# Patient Record
Sex: FEMALE | Race: WHITE | NOT HISPANIC OR LATINO | URBAN - METROPOLITAN AREA
[De-identification: names, ages, dates, MRNs, and addresses within clinical notes are randomized per-mention and may not be internally consistent; named-entity substitution may affect disease eponyms.]

---

## 2020-03-17 ENCOUNTER — EMERGENCY (EMERGENCY)
Facility: HOSPITAL | Age: 35
LOS: 1 days | Discharge: HOME | End: 2020-03-17
Admitting: EMERGENCY MEDICINE
Payer: COMMERCIAL

## 2020-03-17 VITALS
RESPIRATION RATE: 17 BRPM | HEIGHT: 65 IN | WEIGHT: 149.91 LBS | OXYGEN SATURATION: 99 % | HEART RATE: 93 BPM | SYSTOLIC BLOOD PRESSURE: 124 MMHG | TEMPERATURE: 98 F | DIASTOLIC BLOOD PRESSURE: 83 MMHG

## 2020-03-17 DIAGNOSIS — Z03.818 ENCOUNTER FOR OBSERVATION FOR SUSPECTED EXPOSURE TO OTHER BIOLOGICAL AGENTS RULED OUT: ICD-10-CM

## 2020-03-17 DIAGNOSIS — Z88.1 ALLERGY STATUS TO OTHER ANTIBIOTIC AGENTS STATUS: ICD-10-CM

## 2020-03-17 DIAGNOSIS — R05 COUGH: ICD-10-CM

## 2020-03-17 DIAGNOSIS — J06.9 ACUTE UPPER RESPIRATORY INFECTION, UNSPECIFIED: ICD-10-CM

## 2020-03-17 LAB
FLU A RESULT: SIGNIFICANT CHANGE UP
FLU A RESULT: SIGNIFICANT CHANGE UP
FLUAV AG NPH QL: SIGNIFICANT CHANGE UP
FLUBV AG NPH QL: SIGNIFICANT CHANGE UP
RAPID RVP RESULT: SIGNIFICANT CHANGE UP
RSV RESULT: SIGNIFICANT CHANGE UP
RSV RNA RESP QL NAA+PROBE: SIGNIFICANT CHANGE UP

## 2020-03-17 PROCEDURE — 99283 EMERGENCY DEPT VISIT LOW MDM: CPT

## 2020-03-17 RX ORDER — ALBUTEROL 90 UG/1
2 AEROSOL, METERED ORAL
Qty: 1 | Refills: 0
Start: 2020-03-17 | End: 2020-04-15

## 2020-03-17 RX ORDER — ONDANSETRON 8 MG/1
4 TABLET, FILM COATED ORAL ONCE
Refills: 0 | Status: COMPLETED | OUTPATIENT
Start: 2020-03-17 | End: 2020-03-17

## 2020-03-17 RX ORDER — ONDANSETRON 8 MG/1
1 TABLET, FILM COATED ORAL
Qty: 9 | Refills: 0
Start: 2020-03-17 | End: 2020-03-19

## 2020-03-17 RX ADMIN — ONDANSETRON 4 MILLIGRAM(S): 8 TABLET, FILM COATED ORAL at 13:53

## 2020-03-17 NOTE — ED PROVIDER NOTE - PATIENT PORTAL LINK FT
You can access the FollowMyHealth Patient Portal offered by [facility] by registering at the following website: [email or web address]. By joining [facility]’s FollowMyHealth portal, you will also be able to view your health information using other applications (apps) compatible with our system.

## 2020-03-17 NOTE — ED PROVIDER NOTE - NSFOLLOWUPINSTRUCTIONS_ED_ALL_ED_FT
YOUR CORONAVIRUS TEST WILL RESULT IN 5-7 DAYS AND THEN SOMEONE WILL CALL YOU. UNTIL THAT TIME YOU SHOULD QUARANTINE AS IF YOU HAVE CORONAVIRUS.     TAKE TYLENOL AS NEEDED FOR FEVER.     STAY WELL HYDRATED.     USE ZOFRAN THREE TIMES DAILY AS NEEDED FOR NAUSEA.     USE INHALER EVERY 4-6 HOURS FOR FIRST 48 HOURS AND THEN AS NEEDED.     RETURN TO ER FOR CHEST PAIN, TROUBLE BREATHING, TROUBLE WALKING, HIGH FEVER THAT DOES NOT IMPROVE WITH TYLENOL, ANY OTHER CONCERNS.

## 2020-03-17 NOTE — ED PROVIDER NOTE - PROGRESS NOTE DETAILS
rechecked pt - discussed negative flu. will send rx for zofran and inhaler. discussed COVID TAT 5-7 days and to self quarantine until then. will d/c.

## 2020-03-17 NOTE — ED PROVIDER NOTE - CLINICAL SUMMARY MEDICAL DECISION MAKING FREE TEXT BOX
33 y/o female presents with cough, fever, chills, and pleuritic chest discomfort, with nausea and vomiting. 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 Will obtain flu swab, RVP, and COVID testing. Will provide Zofran for symptomatic treatment, will reassess.

## 2020-03-17 NOTE — ED PROVIDER NOTE - OBJECTIVE STATEMENT
35 y/o female, otherwise healthy with no significant PMHx, presents to ED c/o cough. 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 Notes that she was in the clinic on the day that a patient there tested positive for COVID and is concerned. Denies any SOB, abd pain, hematemesis, bloody stools, or urinary symptoms. Patient denies any tobacco use, never required an inhaler in the past.

## 2020-03-17 NOTE — ED ADULT TRIAGE NOTE - CHIEF COMPLAINT QUOTE
Pt walked into ED c.o nausea vomiting cough and chest pain after vomiting and coughing x 4 days. 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 Pt denies fever at this time.

## 2020-03-17 NOTE — ED ADULT NURSE NOTE - OBJECTIVE STATEMENT
Pt aox3.  Comes in w/ c/o flu like symptoms; N/V/D.  States in contact with person pos for Covid at work.  Symptoms worsening last night.

## 2020-03-20 LAB — SARS-COV-2 RNA SPEC QL NAA+PROBE: SIGNIFICANT CHANGE UP

## 2020-03-20 NOTE — ED POST DISCHARGE NOTE - DETAILS
This writer spoke to the patient after her identity was confirmed and told her that her results are still pending.